# Patient Record
Sex: MALE | Race: WHITE | URBAN - METROPOLITAN AREA
[De-identification: names, ages, dates, MRNs, and addresses within clinical notes are randomized per-mention and may not be internally consistent; named-entity substitution may affect disease eponyms.]

---

## 2020-11-09 ENCOUNTER — OFFICE VISIT CONVERTED (OUTPATIENT)
Dept: PODIATRY | Facility: CLINIC | Age: 60
End: 2020-11-09
Attending: PODIATRIST

## 2020-11-09 ENCOUNTER — HOSPITAL ENCOUNTER (OUTPATIENT)
Dept: GENERAL RADIOLOGY | Facility: HOSPITAL | Age: 60
Discharge: HOME | End: 2020-11-09
Attending: PODIATRIST

## 2020-12-08 ENCOUNTER — OFFICE VISIT CONVERTED (OUTPATIENT)
Dept: PODIATRY | Facility: CLINIC | Age: 60
End: 2020-12-08
Attending: PODIATRIST

## 2021-05-11 NOTE — H&P
History and Physical      Patient Name: Satya Torres   Patient ID: [id number]   Sex: Male   YOB: 1960    Primary Care Provider: Isaias Gonzalez MD   Referring Provider: Dave Meadows DPM    Visit Date: November 9, 2020    Provider: Dave Meadows DPM   Location: [facility]   Location Address: [address] [place] [address]  [id number]   Location Phone: [phone]          Chief Complaint  · Right Foot Pain      History Of Present Illness  Satya Torres is a 59 year old male who presents to the [facility] today new patient      New, Established, New Problem:  new  Location:  Right heel  Duration:  Summer 2020  Onset:  gradual  Nature:  achy, sharp, shooting  Stable, worsening, improving:  worsening  Aggravating factors:    Patient describes morning Right heel pain as stabbing, burning, or aching. This pain usually subsides throughout the day, however it returns afterperiods of rest andsitting, when standing back up on their feet,and again the next morning.    Previous Treatment:  stretching    Patient denies any fevers, chills, nausea, vomiting, shortness of breathe, nor any other constitutional signs nor symptoms.       Past Medical History  Foot pain, right; Hypertension; Plantar wart; Skin cancer         Past Surgical History  Colonoscopy; Septoplasty; Vasectomy reversal; Vein Surgery         Medication List  losartan-hydrochlorothiazide 100-25 mg oral tablet; potassium chloride 10 mEq oral tablet extended release; sertraline 100 mg oral tablet         Allergy List  SULFA (SULFONAMIDES)       Allergies Reconciled  Family Medical History  FH: breast cancer; FH: heart disease; FH: heart attack; FH: skin cancer; FH: prostate cancer         Social History  Alcohol (Current some day); Tobacco (Never)         Review of Systems  · Constitutional  o Denies  o : fatigue, night sweats  · Eyes  o Denies  o : double vision, blurred  "vision  · HENT  o Denies  o : vertigo, recent head injury  · Cardiovascular  o Denies  o : chest pain, irregular heart beats  · Respiratory  o Denies  o : shortness of breath, productive cough  · Gastrointestinal  o Denies  o : nausea, vomiting  · Genitourinary  o Denies  o : dysuria, urinary retention  · Integument  o Denies  o : hair growth change, new skin lesions  · Neurologic  o Denies  o : altered mental status, seizures  · Musculoskeletal  o * See HPI  · Endocrine  o Denies  o : cold intolerance, heat intolerance  · Heme-Lymph  o Denies  o : petechiae, lymph node enlargement or tenderness  · Allergic-Immunologic  o Denies  o : frequent illnesses      Vitals  Date Time BP Position Site L\R Cuff Size HR RR TEMP (F) WT  HT  BMI kg/m2 BSA m2 O2 Sat FR L/min FiO2 HC       11/09/2020 02:12 /40 Sitting    94 - R  97.6 213lbs 2oz 5'  11\" 29.72 2.2 99 %            Physical Examination  · Constitutional  o Appearance  o : Awake, alert, well developed, well nourished and well groomed  · Cardiovascular  o Peripheral Vascular System  o :   § Pedal Pulses  § : Pedal Pulses are +2 and symmetrical   § Extremities  § : No edema of the lower extremities  · Skin and Subcutaneous Tissue  o General Inspection  o : Skin is noted to have normal texture and turgor, with no excresences noted.  o Digits and Nails  o : The tonails are noted to be without disease.  · Neurologic  o Sensation  o : Epicritic sensations intact bilaterally.  · Right Ankle/Foot  o Inspection  o : Positive tenderness to palpaiton to the medial tubercle of the calcaneus. No signs of edema, erythema, lymphangitis, nor signs of infection.   · Injection Note/Aspiration Note  o Site  o : right heel  o Procedure  o : This patient presents for a trigger point injection. I have discussed the nature, risks, benefits, alternatives and limitations of this procedure with this patient and obtained informed consent. The area was sterilely prepped with isopropyl " alcohol. A 27 gauge, 1.25 inch needle was inserted iinto the affected area. A sterile dressing was applied to the area.  o Medication  o : 0.5ml of 1% lidocaine plain, 0.5ml of 40mg/ml Kenalog, and 0.5ml of 4mg/ml Dexamethasone   o Disposition  o : The patient tolerated the procedure well     Dr. Meadows reviewed radiographs and results from [facility] and discussed them with the patient.  Right foot.  These are significant for Inferior calcaneal spurring seen.           Assessment  · Foot pain, right     729.5/M79.671  · Plantar fascial fibromatosis of right foot     728.71/M72.2      Plan  · Orders  o Decadron 4 mg/1cc Injection () - 729.5/M79.671, 728.71/M72.2 - 11/09/2020   Injection - Dexamethasone 4mg/mL; Dose: 2 mg; Site: Not Entered; Route: subcutaneous; Date: 11/09/2020 14:43:10; Exp: 12/31/2020; Lot: [id number]; Mfg: [employer]; TradeName: dexamethasone sodium phosphate; Location: [facility]; Administered By: Dave Meadows DPM; Comment: ndc 29047-448-82 inj site right foot  o 2.00 - Kenalog Injection 20mg (-3) - 729.5/M79.671, 728.71/M72.2 - 11/09/2020   Injection - Kenalog 20 mg; Dose: 20mg; Site: Not Entered; Route: subcutaneous; Date: 11/09/2020 14:44:02; Exp: 04/30/2022; Lot: [id number]; Mfg: [employer].; TradeName: triamcinolone acetonide; Location: [facility]; Administered By: Dave Meadows DPM; Comment: ndc [id number] inj site right foot  o Inj Tendon Sheath Or Ligament (64393) - 729.5/M79.671, 728.71/M72.2 - 11/09/2020  · Medications  o diclofenac sodium 75 mg oral tablet,delayed release (/EC)   SIG: take 1 tablet (75 mg) by oral route 2 times per day for 30 days   DISP: (60) Tablet with 1 refills  Prescribed on 11/09/2020     o Medications have been Reconciled  o Transition of Care or Provider Policy  · Instructions  o Handouts provided regarding Planter Fascitis.  o Overview: This patient has a plantar fasciitis.  o Discuss Findings: I have discussed the  findings of this evaluation with the patient. The discussion included a complete verbal explanation of any changes in the examination results, diagnosis, and the current treatment plan. A schedule for future care needs was explained. If any questions should arise after returning home, I have encouraged the patient to feel free to contact Dr. Meadows. The patient states understanding and agreement with this plan.  o Monitor For Problems: Pt to monitor for problems and to contact Dr. Meadows for follow-up should such signs occur. Patient states understanding and agreement with this plan.  o Spenco: Recommend OTC Spenco Orthotics.  o Treatement Alternatives: Nonsurgical treatments almost always improve the pain. Treatment can last from several months to 2 years before symptoms get better. Most patients feel better in 9 months. Rarely Some people need surgery to relieve the pain.  o Conservative Treatment Recommendations: Anti-inflammatory medication to reduce pain and inflammation, Heel stretching exercises,   o Follow Up in 2 weeks.  o Patient may begin to weight bear as tolerated in supportive shoes. No impact activities for two weeks. After that time, the patient may increase activities as tolerated. Patient states understanding and agreement with this plan.  o Electronically Identified Patient Education Materials Provided Electronically  · Disposition  o Call or Return if symptoms worsen or persist.            Electronically Signed by: Dave Meadows DPM -Author on November 9, 2020 04:28:37 PM

## 2021-05-14 VITALS
WEIGHT: 25 LBS | TEMPERATURE: 97.5 F | HEART RATE: 65 BPM | SYSTOLIC BLOOD PRESSURE: 159 MMHG | DIASTOLIC BLOOD PRESSURE: 91 MMHG | OXYGEN SATURATION: 97 % | HEIGHT: 71 IN | BODY MASS INDEX: 3.5 KG/M2

## 2021-05-14 VITALS
DIASTOLIC BLOOD PRESSURE: 40 MMHG | HEART RATE: 94 BPM | HEIGHT: 71 IN | BODY MASS INDEX: 29.84 KG/M2 | SYSTOLIC BLOOD PRESSURE: 140 MMHG | OXYGEN SATURATION: 99 % | WEIGHT: 213.12 LBS | TEMPERATURE: 97.6 F

## 2021-05-14 NOTE — PROGRESS NOTES
Progress Note      Patient Name: Satya Torres   Patient ID: [id number]   Sex: Male   YOB: 1960    Primary Care Provider: Provider Other Other   Referring Provider: Dave Meadows DPM    Visit Date: December 8, 2020    Provider: Dave Medaows DPM   [place] [facility]   Location Address: [address] [place] [address]  [id number]   Location Phone: [phone]          Chief Complaint  · Right Foot Pain      History Of Present Illness  Satya Torres is a 60 year old male who presents to the [facility] today a follow up for:      New, Established, New Problem:  est  Location:  Right heel  Duration:  Summer 2020  Onset:  gradual  Nature:  achy, sharp, shooting  Stable, worsening, improving:  modest improvement  Aggravating factors:    Patient describes morning Right heel pain as stabbing, burning, or aching. This pain usually subsides throughout the day, however it returns afterperiods of rest andsitting, when standing back up on their feet,and again the next morning.    Previous Treatment:  stretching, cortisone injection    Patient denies any fevers, chills, nausea, vomiting, shortness of breathe, nor any other constitutional signs nor symptoms.       Past Medical History  Foot pain, right; Hypertension; Plantar wart; Skin cancer         Past Surgical History  Colonoscopy; Septoplasty; Vasectomy reversal; Vein Surgery         Medication List  diclofenac sodium 75 mg oral tablet,delayed release (DR/EC); losartan-hydrochlorothiazide 100-25 mg oral tablet; potassium chloride 10 mEq oral tablet extended release; sertraline 25 mg oral tablet         Allergy List  SULFA (SULFONAMIDES)       Allergies Reconciled  Family Medical History  FH: breast cancer; FH: heart disease; FH: heart attack; FH: skin cancer; FH: prostate cancer         Social History  Alcohol (Current some day); Tobacco (Never)         Review of Systems  · Constitutional  o Denies  o :  "fatigue, night sweats  · Eyes  o Denies  o : double vision, blurred vision  · HENT  o Denies  o : vertigo, recent head injury  · Cardiovascular  o Denies  o : chest pain, irregular heart beats  · Respiratory  o Denies  o : shortness of breath, productive cough  · Gastrointestinal  o Denies  o : nausea, vomiting  · Genitourinary  o Denies  o : dysuria, urinary retention  · Integument  o Denies  o : hair growth change, new skin lesions  · Neurologic  o Denies  o : altered mental status, seizures  · Musculoskeletal  o * See HPI  · Endocrine  o Denies  o : cold intolerance, heat intolerance  · Heme-Lymph  o Denies  o : petechiae, lymph node enlargement or tenderness  · Allergic-Immunologic  o Denies  o : frequent illnesses      Vitals  Date Time BP Position Site L\R Cuff Size HR RR TEMP (F) WT  HT  BMI kg/m2 BSA m2 O2 Sat FR L/min FiO2 HC       12/08/2020 01:48 /91 Sitting    65 - R  97.5 25lbs 0oz 5'  11\" 3.49 0.75 97 %      12/08/2020 01:48 /94 Sitting                       Physical Examination  · Constitutional  o Appearance  o : Awake, alert, well developed, well nourished and well groomed  · Cardiovascular  o Peripheral Vascular System  o :   § Pedal Pulses  § : Pedal Pulses are +2 and symmetrical   § Extremities  § : No edema of the lower extremities  · Skin and Subcutaneous Tissue  o General Inspection  o : Skin is noted to have normal texture and turgor, with no excresences noted.  o Digits and Nails  o : The tonails are noted to be without disease.  · Neurologic  o Sensation  o : Epicritic sensations intact bilaterally.  · Right Ankle/Foot  o Inspection  o : Mild tenderness to palpaiton to the medial tubercle of the calcaneus. No signs of edema, erythema, lymphangitis, nor signs of infection.   · Injection Note/Aspiration Note  o Site  o : right heel  o Procedure  o : This patient presents for a trigger point injection. I have discussed the nature, risks, benefits, alternatives and limitations of " this procedure with this patient and obtained informed consent. The area was sterilely prepped with isopropyl alcohol. A 27 gauge, 1.25 inch needle was inserted iinto the affected area. A sterile dressing was applied to the area.  o Medication  o : 0.5ml of 1% lidocaine plain, 0.5ml of 40mg/ml Kenalog, and 0.5ml of 4mg/ml Dexamethasone   o Disposition  o : The patient tolerated the procedure well          Assessment  · Foot pain, right     729.5/M79.671  · Plantar fascial fibromatosis of right foot     728.71/M72.2      Plan  · Orders  o Decadron 4 mg/1cc Injection () - 729.5/M79.671, 728.71/M72.2 - 12/08/2020  o 2.00 - Kenalog Injection 20mg (-3) - 729.5/M79.671, 728.71/M72.2 - 12/08/2020  o Inj Tendon Sheath Or Ligament (23180) - 729.5/M79.671, 728.71/M72.2 - 12/08/2020  · Medications  o Medications have been Reconciled  o Transition of Care or Provider Policy  · Instructions  o Handouts provided regarding Planter Fascitis.  o Overview: This patient has a plantar fasciitis.  o Discuss Findings: I have discussed the findings of this evaluation with the patient. The discussion included a complete verbal explanation of any changes in the examination results, diagnosis, and the current treatment plan. A schedule for future care needs was explained. If any questions should arise after returning home, I have encouraged the patient to feel free to contact Dr. Meadows. The patient states understanding and agreement with this plan.  o Monitor For Problems: Pt to monitor for problems and to contact Dr. Meadows for follow-up should such signs occur. Patient states understanding and agreement with this plan.  o Spenco: Recommend OTC Spenco Orthotics.  o Treatement Alternatives: Nonsurgical treatments almost always improve the pain. Treatment can last from several months to 2 years before symptoms get better. Most patients feel better in 9 months. Rarely Some people need surgery to relieve the pain.  o Conservative  Treatment Recommendations: Anti-inflammatory medication to reduce pain and inflammation, Heel stretching exercises,   o Patient may begin to weight bear as tolerated in supportive shoes. No impact activities for two weeks. After that time, the patient may increase activities as tolerated. Patient states understanding and agreement with this plan.  o Patient request PRN follow-up.  o Electronically Identified Patient Education Materials Provided Electronically  · Disposition  o Call or Return if symptoms worsen or persist.            Electronically Signed by: Dave Meadows DPM -Author on December 8, 2020 02:32:00 PM